# Patient Record
Sex: MALE | Race: WHITE | NOT HISPANIC OR LATINO | Employment: FULL TIME | URBAN - METROPOLITAN AREA
[De-identification: names, ages, dates, MRNs, and addresses within clinical notes are randomized per-mention and may not be internally consistent; named-entity substitution may affect disease eponyms.]

---

## 2023-02-06 ENCOUNTER — OFFICE VISIT (OUTPATIENT)
Dept: INTERNAL MEDICINE | Age: 28
End: 2023-02-06
Payer: COMMERCIAL

## 2023-02-06 VITALS
HEIGHT: 71 IN | DIASTOLIC BLOOD PRESSURE: 72 MMHG | BODY MASS INDEX: 34.44 KG/M2 | SYSTOLIC BLOOD PRESSURE: 110 MMHG | HEART RATE: 89 BPM | TEMPERATURE: 97.5 F | OXYGEN SATURATION: 97 % | WEIGHT: 246 LBS

## 2023-02-06 DIAGNOSIS — E66.09 CLASS 1 OBESITY DUE TO EXCESS CALORIES WITH BODY MASS INDEX (BMI) OF 34.0 TO 34.9 IN ADULT, UNSPECIFIED WHETHER SERIOUS COMORBIDITY PRESENT: ICD-10-CM

## 2023-02-06 DIAGNOSIS — Z76.89 ENCOUNTER TO ESTABLISH CARE: Primary | ICD-10-CM

## 2023-02-06 DIAGNOSIS — E55.9 VITAMIN D DEFICIENCY: ICD-10-CM

## 2023-02-06 PROBLEM — E66.811 CLASS 1 OBESITY DUE TO EXCESS CALORIES WITH BODY MASS INDEX (BMI) OF 34.0 TO 34.9 IN ADULT: Status: ACTIVE | Noted: 2023-02-06

## 2023-02-06 PROCEDURE — 99204 OFFICE O/P NEW MOD 45 MIN: CPT

## 2023-02-06 NOTE — PROGRESS NOTES
"    I N T E R N A L  M E D I C I N E  Mary Moreno, APRN    ENCOUNTER DATE:  02/06/2023    Sukhwinder KEYS Hiser / 27 y.o. / male      CHIEF COMPLAINT / REASON FOR OFFICE VISIT     Establish Care, Weight Gain, and ADHD (Need to be tested . Having issue on focus on the new job/)      ASSESSMENT & PLAN     Diagnoses and all orders for this visit:    1. Encounter to establish care (Primary)  -     CBC & Differential  -     Comprehensive Metabolic Panel  -     Hemoglobin A1c  -     Lipid Panel With / Chol / HDL Ratio  -     TSH+Free T4  -     Urinalysis With Microscopic If Indicated (No Culture) - Urine, Clean Catch    2. Class 1 obesity due to excess calories with body mass index (BMI) of 34.0 to 34.9 in adult, unspecified whether serious comorbidity present    3. Vitamin D deficiency  -     Vitamin D,25-Hydroxy         SUMMARY/DISCUSSION  • Agreeable for labs at today's appointment.  • Discussed possibility of psychiatry referral for ADHD evaluation.  Would like to HOLD at this time.  • Discussed possibility of GLP-1 injectable treatment for weight loss, including risks, benefits, side effects.  He would like to possibly proceed with RX pending lab results.      Next Appointment with me: Visit date not found    Return in about 4 months (around 6/6/2023) for Annual physical.      VITAL SIGNS     Visit Vitals  /72   Pulse 89   Temp 97.5 °F (36.4 °C)   Ht 180.3 cm (71\")   Wt 112 kg (246 lb)   SpO2 97%   BMI 34.31 kg/m²             Wt Readings from Last 3 Encounters:   02/06/23 112 kg (246 lb)     Body mass index is 34.31 kg/m².        MEDICATIONS AT THE TIME OF OFFICE VISIT     No current outpatient medications on file prior to visit.     No current facility-administered medications on file prior to visit.        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  Works as a ; former " [employer].    Will be getting  this May 2023.    He has been focused on limiting cheese and salt in diet.  Is incorporating protein, vegetables in diet.  He is going to gym 3x weekly.    Denies any personal or family history of pancreatitis or thyroid cancer.  Mom with hx of hyperthyroidism.      He suspects history of ADHD, but has never received a formal diagnosis.  Reports ongoing difficulty focusing at work, more so after transitioning to desk job.  Did have problems as a child in school with inattention, acting out.  No concerns for depression/ anxiety.      Patient Care Team:  Mary Moreno APRN as PCP - General (Family Medicine)    REVIEW OF SYSTEMS     Review of Systems   Constitutional: Negative for chills, fever and unexpected weight change.   Respiratory: Negative for cough, chest tightness and shortness of breath.    Cardiovascular: Negative for chest pain, palpitations and leg swelling.   Gastrointestinal: Negative for abdominal pain, constipation, nausea and vomiting.   Neurological: Negative for dizziness, weakness, light-headedness and headaches.   Psychiatric/Behavioral: Negative for dysphoric mood, self-injury and suicidal ideas. The patient is not nervous/anxious.           PHYSICAL EXAMINATION     Physical Exam  Vitals reviewed.   Constitutional:       General: He is not in acute distress.     Appearance: Normal appearance. He is not ill-appearing, toxic-appearing or diaphoretic.   HENT:      Head: Normocephalic and atraumatic.   Cardiovascular:      Rate and Rhythm: Normal rate and regular rhythm.      Heart sounds: Normal heart sounds.   Pulmonary:      Effort: Pulmonary effort is normal.      Breath sounds: Normal breath sounds.   Abdominal:      Palpations: Abdomen is soft.      Tenderness: There is no abdominal tenderness.   Musculoskeletal:      Right lower leg: No edema.      Left lower leg: No edema.   Neurological:      Mental Status: He is alert and oriented to person, place, and  time. Mental status is at baseline.   Psychiatric:         Mood and Affect: Mood normal.         Behavior: Behavior normal.         Thought Content: Thought content normal.         Judgment: Judgment normal.           REVIEWED DATA     Labs:           Imaging:            Medical Tests:           Summary of old records / correspondence / consultant report:           Request outside records:

## 2023-02-07 ENCOUNTER — TELEPHONE (OUTPATIENT)
Dept: INTERNAL MEDICINE | Age: 28
End: 2023-02-07

## 2023-02-07 DIAGNOSIS — R73.03 PREDIABETES: ICD-10-CM

## 2023-02-07 DIAGNOSIS — E78.00 PURE HYPERCHOLESTEROLEMIA: ICD-10-CM

## 2023-02-07 DIAGNOSIS — E66.09 CLASS 1 OBESITY DUE TO EXCESS CALORIES WITH SERIOUS COMORBIDITY AND BODY MASS INDEX (BMI) OF 34.0 TO 34.9 IN ADULT: Primary | ICD-10-CM

## 2023-02-07 LAB
25(OH)D3+25(OH)D2 SERPL-MCNC: 21.3 NG/ML (ref 30–100)
ALBUMIN SERPL-MCNC: 4.4 G/DL (ref 3.5–5.2)
ALBUMIN/GLOB SERPL: 2 G/DL
ALP SERPL-CCNC: 46 U/L (ref 39–117)
ALT SERPL-CCNC: 43 U/L (ref 1–41)
APPEARANCE UR: CLEAR
AST SERPL-CCNC: 28 U/L (ref 1–40)
BACTERIA #/AREA URNS HPF: NORMAL /HPF
BASOPHILS # BLD AUTO: 0.04 10*3/MM3 (ref 0–0.2)
BASOPHILS NFR BLD AUTO: 0.7 % (ref 0–1.5)
BILIRUB SERPL-MCNC: 0.4 MG/DL (ref 0–1.2)
BILIRUB UR QL STRIP: NEGATIVE
BUN SERPL-MCNC: 12 MG/DL (ref 6–20)
BUN/CREAT SERPL: 14.1 (ref 7–25)
CALCIUM SERPL-MCNC: 9.2 MG/DL (ref 8.6–10.5)
CASTS URNS MICRO: NORMAL
CHLORIDE SERPL-SCNC: 105 MMOL/L (ref 98–107)
CHOLEST SERPL-MCNC: 199 MG/DL (ref 0–200)
CHOLEST/HDLC SERPL: 3.9 {RATIO}
CO2 SERPL-SCNC: 26.5 MMOL/L (ref 22–29)
COLOR UR: YELLOW
CREAT SERPL-MCNC: 0.85 MG/DL (ref 0.76–1.27)
EGFRCR SERPLBLD CKD-EPI 2021: 120.6 ML/MIN/1.73
EOSINOPHIL # BLD AUTO: 0.21 10*3/MM3 (ref 0–0.4)
EOSINOPHIL NFR BLD AUTO: 3.6 % (ref 0.3–6.2)
EPI CELLS #/AREA URNS HPF: NORMAL /HPF
ERYTHROCYTE [DISTWIDTH] IN BLOOD BY AUTOMATED COUNT: 12.3 % (ref 12.3–15.4)
GLOBULIN SER CALC-MCNC: 2.2 GM/DL
GLUCOSE SERPL-MCNC: 104 MG/DL (ref 65–99)
GLUCOSE UR QL STRIP: NEGATIVE
HBA1C MFR BLD: 5.8 % (ref 4.8–5.6)
HCT VFR BLD AUTO: 42.2 % (ref 37.5–51)
HDLC SERPL-MCNC: 51 MG/DL (ref 40–60)
HGB BLD-MCNC: 14.6 G/DL (ref 13–17.7)
HGB UR QL STRIP: NEGATIVE
IMM GRANULOCYTES # BLD AUTO: 0.01 10*3/MM3 (ref 0–0.05)
IMM GRANULOCYTES NFR BLD AUTO: 0.2 % (ref 0–0.5)
KETONES UR QL STRIP: NEGATIVE
LDLC SERPL CALC-MCNC: 123 MG/DL (ref 0–100)
LEUKOCYTE ESTERASE UR QL STRIP: ABNORMAL
LYMPHOCYTES # BLD AUTO: 1.86 10*3/MM3 (ref 0.7–3.1)
LYMPHOCYTES NFR BLD AUTO: 31.8 % (ref 19.6–45.3)
MCH RBC QN AUTO: 30.8 PG (ref 26.6–33)
MCHC RBC AUTO-ENTMCNC: 34.6 G/DL (ref 31.5–35.7)
MCV RBC AUTO: 89 FL (ref 79–97)
MONOCYTES # BLD AUTO: 0.63 10*3/MM3 (ref 0.1–0.9)
MONOCYTES NFR BLD AUTO: 10.8 % (ref 5–12)
NEUTROPHILS # BLD AUTO: 3.09 10*3/MM3 (ref 1.7–7)
NEUTROPHILS NFR BLD AUTO: 52.9 % (ref 42.7–76)
NITRITE UR QL STRIP: NEGATIVE
NRBC BLD AUTO-RTO: 0 /100 WBC (ref 0–0.2)
PH UR STRIP: 6 [PH] (ref 5–8)
PLATELET # BLD AUTO: 413 10*3/MM3 (ref 140–450)
POTASSIUM SERPL-SCNC: 4.5 MMOL/L (ref 3.5–5.2)
PROT SERPL-MCNC: 6.6 G/DL (ref 6–8.5)
PROT UR QL STRIP: NEGATIVE
RBC # BLD AUTO: 4.74 10*6/MM3 (ref 4.14–5.8)
RBC #/AREA URNS HPF: NORMAL /HPF
SODIUM SERPL-SCNC: 141 MMOL/L (ref 136–145)
SP GR UR STRIP: 1.02 (ref 1–1.03)
T4 FREE SERPL-MCNC: 1.09 NG/DL (ref 0.93–1.7)
TRIGL SERPL-MCNC: 143 MG/DL (ref 0–150)
TSH SERPL DL<=0.005 MIU/L-ACNC: 0.96 UIU/ML (ref 0.27–4.2)
UROBILINOGEN UR STRIP-MCNC: ABNORMAL MG/DL
VLDLC SERPL CALC-MCNC: 25 MG/DL (ref 5–40)
WBC # BLD AUTO: 5.84 10*3/MM3 (ref 3.4–10.8)
WBC #/AREA URNS HPF: NORMAL /HPF

## 2023-02-07 RX ORDER — SEMAGLUTIDE 0.25 MG/.5ML
0.25 INJECTION, SOLUTION SUBCUTANEOUS WEEKLY
Qty: 2 ML | Refills: 0 | Status: SHIPPED | OUTPATIENT
Start: 2023-02-07 | End: 2023-03-01

## 2023-02-07 NOTE — TELEPHONE ENCOUNTER
Caller: Sukhwinder Gonzalez    Relationship: Self    Best call back number: [phone]    Who are you requesting to speak with (clinical staff, provider,  specific staff member): CLINICAL STAFF     What was the call regarding: NEEDING A PA FOR Semaglutide-Weight Management (Wegovy) 0.25 MG/0.5ML solution auto-injector  INDICATING WHY ITS MEDICAL NECESSARY   FOR PATIENT     Do you require a callback:YES

## 2023-02-15 DIAGNOSIS — R41.840 INATTENTION: Primary | ICD-10-CM

## 2023-06-15 ENCOUNTER — OFFICE VISIT (OUTPATIENT)
Dept: INTERNAL MEDICINE | Age: 28
End: 2023-06-15
Payer: COMMERCIAL

## 2023-06-15 VITALS
SYSTOLIC BLOOD PRESSURE: 120 MMHG | WEIGHT: 235 LBS | HEART RATE: 113 BPM | BODY MASS INDEX: 32.9 KG/M2 | HEIGHT: 71 IN | TEMPERATURE: 97.8 F | OXYGEN SATURATION: 96 % | DIASTOLIC BLOOD PRESSURE: 80 MMHG

## 2023-06-15 DIAGNOSIS — E55.9 VITAMIN D DEFICIENCY: ICD-10-CM

## 2023-06-15 DIAGNOSIS — R73.03 PREDIABETES: ICD-10-CM

## 2023-06-15 DIAGNOSIS — G47.9 SLEEP DISTURBANCE: ICD-10-CM

## 2023-06-15 DIAGNOSIS — F17.290 VAPING NICOTINE DEPENDENCE, TOBACCO PRODUCT: ICD-10-CM

## 2023-06-15 DIAGNOSIS — Z23 ENCOUNTER FOR IMMUNIZATION: ICD-10-CM

## 2023-06-15 DIAGNOSIS — Z00.00 ANNUAL PHYSICAL EXAM: Primary | ICD-10-CM

## 2023-06-15 PROBLEM — K29.70 GASTRITIS: Status: ACTIVE | Noted: 2018-07-13

## 2023-06-15 PROBLEM — G47.00 INSOMNIA: Status: ACTIVE | Noted: 2018-07-13

## 2023-06-15 PROBLEM — Z86.69 HISTORY OF OTITIS MEDIA: Status: ACTIVE | Noted: 2023-06-15

## 2023-06-15 PROBLEM — F41.9 ANXIETY: Status: ACTIVE | Noted: 2019-11-19

## 2023-06-15 PROBLEM — F17.200 SMOKES AND MOTIVATED TO QUIT: Status: ACTIVE | Noted: 2017-07-31

## 2023-06-15 NOTE — PROGRESS NOTES
"    I N T E R N A L  M E D I C I N E  Mary Moreno, SALOME      ENCOUNTER DATE:  06/15/2023    Sukhwinder KEYS Hiser / 27 y.o. / male    CHIEF COMPLAINT     Annual Exam    Accompanied by his wife to today's appointment.    BMI 32: Down 11 pounds since February 2023.  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  Works as a ; former .  He does continue to vape regularly.      Recently  as of May 2023.  Prior to wedding/ honeymoon, he was focused on healthy dietary choices and regular exercise.  He has recently been less focused.  He is still interested in pursuing possible GLP-1 use but is waiting to be added to his wife's medical insurance.    Does report chronic, poor sleep.  He falls asleep at midnight and sleeps until approximately 5 AM.  Wife report he does snore and has very restless sleep, tossing and turning.      Now followed by [facility] for ADHD.  Prescribed Adderall 15 mg in the AM and 15 mg in the PM with benefit.    Prediabetes: February 2023 A1C of 5.8.    HLD: February 2023 Lipid panel LDL of 123; triglycerides 143.    Vitamin D deficiency: February 2023 Vitamin D 21.3.  He did take vitamin D supplementation for a short time.    VITALS     Visit Vitals  /80   Pulse 113   Temp 97.8 °F (36.6 °C)   Ht 180.3 cm (70.98\")   Wt 107 kg (235 lb)   SpO2 96%   BMI 32.79 kg/m²       BP Readings from Last 3 Encounters:   06/15/23 120/80   02/06/23 110/72     Wt Readings from Last 3 Encounters:   06/15/23 107 kg (235 lb)   02/06/23 112 kg (246 lb)      Body mass index is 32.79 kg/m².      MEDICATIONS     Current Outpatient Medications on File Prior to Visit   Medication Sig Dispense Refill    amphetamine-dextroamphetamine (ADDERALL) 15 MG tablet Take 1 tablet by mouth Daily at 1 pm. 30 tablet 0    amphetamine-dextroamphetamine (ADDERALL) 15 MG tablet Take 1 tablet by mouth " Every Morning. 30 tablet 0     No current facility-administered medications on file prior to visit.         HISTORY OF PRESENT ILLNESS      Sukhwinder presents for annual health maintenance visit.    General health: fair  Lifestyle:  Attempting to lose weight?: Yes   Diet: has not been eating as healthy  Exercise: does not exercise  Tobacco: Vapes regularly    Alcohol: 2 days/week  Work: Full-time  Reproductive health:  Sexually active?: Yes   Concern for STD?: No   Sexual problems?: No problems   Sees Urologist?: No   Depression Screenin/6/2023     7:46 AM   PHQ-2/PHQ-9 Depression Screening   Little Interest or Pleasure in Doing Things 0-->not at all   Feeling Down, Depressed or Hopeless 0-->not at all   PHQ-9: Brief Depression Severity Measure Score 0         PHQ-2: 0 (Not depressed)     PHQ-9: 0 (Negative screening for depression)    Patient Care Team:  Mary Moreno APRN as PCP - General (Family Medicine)  ______________________________________________________________________    ALLERGIES  No Known Allergies     PFSH:     The following portions of the patient's history were reviewed and updated as appropriate: Allergies / Current Medications / Past Medical History / Surgical History / Social History / Family History    PROBLEM LIST   Patient Active Problem List   Diagnosis    Class 1 obesity due to excess calories with body mass index (BMI) of 34.0 to 34.9 in adult    Prediabetes    Pure hypercholesterolemia    Anxiety    Gastritis    History of otitis media    Insomnia    Smokes and motivated to quit       PAST MEDICAL HISTORY  Past Medical History:   Diagnosis Date    ADHD (attention deficit hyperactivity disorder)     Never tested or treated    HL (hearing loss)     Multiple surgeries for ‘tubes’ in ears at a young age    Visual impairment     Carolyn had contacts/prescription glasses since I was 10-12 years old (roughly)       SURGICAL HISTORY  Past Surgical History:   Procedure Laterality Date     FRACTURE SURGERY  2017    Suffered a fractured/broken jaw and have a plate/screws now. Surgery done at [facility]       SOCIAL HISTORY  Social History     Socioeconomic History    Marital status: Single   Tobacco Use    Smoking status: Former     Packs/day: 1.00     Years: 10.00     Pack years: 10.00     Types: Cigarettes, Electronic Cigarette     Start date: 2013     Quit date: 2022     Years since quittin.4   Vaping Use    Vaping Use: Every day    Substances: Nicotine   Substance and Sexual Activity    Alcohol use: Yes     Comment: A few beers on social occasions    Drug use: Never    Sexual activity: Yes     Partners: Female     Birth control/protection: I.U.D.     Comment: Fiance has an IUD- fiancé has a history of HPV       FAMILY HISTORY  Family History   Problem Relation Age of Onset    Vision loss Mother         Poor vision, glasses and contacts    Thyroid disease Mother         thyroid removed    Heart failure Maternal Grandmother 82    Breast cancer Paternal Grandmother     Lupus Paternal Grandmother        IMMUNIZATION HISTORY  Immunization History   Administered Date(s) Administered    COVID-19 (PFIZER) Purple Cap Monovalent 2021, 2021    DTaP 2012    DTaP, Unspecified 1998    FluLaval/Fluzone >6mos 2019    Influenza, Unspecified 2019    MMR 1998    OPV 1998    Tdap 2012         REVIEW OF SYSTEMS     Review of Systems   Constitutional:  Negative for chills, fever and unexpected weight change.   Respiratory:  Negative for cough, chest tightness and shortness of breath.    Cardiovascular:  Negative for chest pain, palpitations and leg swelling.   Neurological:  Negative for dizziness, weakness, light-headedness and headaches.   Psychiatric/Behavioral:  Positive for sleep disturbance. The patient is not nervous/anxious.      PHYSICAL EXAMINATION     Physical Exam  Vitals reviewed.   Constitutional:       General: He is not in acute distress.      Appearance: Normal appearance. He is not ill-appearing, toxic-appearing or diaphoretic.   HENT:      Head: Normocephalic and atraumatic.   Cardiovascular:      Rate and Rhythm: Normal rate and regular rhythm.      Heart sounds: Normal heart sounds.   Pulmonary:      Effort: Pulmonary effort is normal.      Breath sounds: Normal breath sounds.   Neurological:      Mental Status: He is alert and oriented to person, place, and time. Mental status is at baseline.   Psychiatric:         Mood and Affect: Mood normal.         Behavior: Behavior normal.         Thought Content: Thought content normal.         Judgment: Judgment normal.       REVIEWED DATA      Labs:    Lab Results   Component Value Date     02/06/2023    K 4.5 02/06/2023    CALCIUM 9.2 02/06/2023    AST 28 02/06/2023    ALT 43 (H) 02/06/2023    BUN 12 02/06/2023    CREATININE 0.85 02/06/2023    CREATININE 0.7 01/14/2021    CREATININE 0.7 11/19/2019       Lab Results   Component Value Date    GLUCOSE 104 (H) 02/06/2023    HGBA1C 5.80 (H) 02/06/2023    HGBA1C 5.5 01/14/2021    TSH 0.964 02/06/2023    FREET4 1.09 02/06/2023       No results found for: PSA    [unfilled]    Lab Results   Component Value Date     (H) 02/06/2023    HDL 51 02/06/2023    TRIG 143 02/06/2023    CHOLHDLRATIO 3.90 02/06/2023       No components found for: [id number]    Lab Results   Component Value Date    WBC 5.84 02/06/2023    HGB 14.6 02/06/2023    MCV 89.0 02/06/2023     02/06/2023       Lab Results   Component Value Date    PROTEIN Negative 02/06/2023    GLUCOSEU Negative 02/06/2023    BLOODU Negative 02/06/2023    NITRITEU Negative 02/06/2023    LEUKOCYTESUR Trace (A) 02/06/2023          Lab Results   Component Value Date    HEPCVIRUSABY Nonreactive 11/19/2019       Imaging:           Medical Tests:           ASSESSMENT & PLAN     ANNUAL WELLNESS EXAM / PHYSICAL     Diagnoses and all orders for this visit:    1. Annual physical exam (Primary)    2. Prediabetes  -      Hemoglobin A1c  -     Comprehensive Metabolic Panel    3. Sleep disturbance  -     Ambulatory Referral to Sleep Medicine    4. Vaping nicotine dependence, tobacco product    5. Vitamin D deficiency  -     Vitamin D,25-Hydroxy    6. Encounter for immunization  -     Tdap Vaccine Greater Than or Equal To 8yo IM         Summary/Discussion:     Will evaluate for sleep apnea with sleep medicine referral.  Brainstormed with pt and his wife possible ways to help with decreasing vaping use and ultimately, cessation.  He has plans to use gum to help with reduced use.  He will let me know when he obtains new insurance coverage, and will anticipate GLP-1 prescription.     Next Appointment with me: Visit date not found    Return for 6 month chronic care, then 1 year annual physical.      HEALTHCARE MAINTENANCE ISSUES       Cancer Screening:  Colon: Initial/Next screening at age: 45  Repeat colon cancer screening: N/A at this time  Prostate: Start screening at 45 then annually  Testicular: Recommended monthly self exam  Skin: Monthly self skin examination, annual exam by health professional  Lung: Does not meet criteria for lung cancer screening.   Other:    Screening Labs & Tests:  Lab results reviewed & discussed with with patient or orders placed today.  EKG:  CV Screening: Lipid panel  DEXA (75+ or risk factors):   HEP C (If born [id number] or risk factors): Previously had negative screen  Other:     Immunization/Vaccinations (to be given today unless deferred by patient)  Influenza: Recommended annual influenza vaccine  Hepatitis A: Verify immunization records  Hepatitis B: Verify immunization records  Tetanus/Pertussis: Recommended here or at pharmacy  Pneumovax/PCV: Recommended here or at pharmacy  Shingles: Not needed at this time  COVID: Recommended the bivalent booster shot  Lifestyle Counseling:  Lifestyle Modifications: Attempt to lose weight, Improve dietary compliance, Begin progressive aerobic exercise program  3-5 days a week, Maintain a low sugar/carbohydrate diet, Follow a low fat, low cholesterol diet, Make dinner the lightest meal of day, and Quit vaping   Safety Issues: Always wear seatbelt, Avoid texting while driving   Use sunscreen, regular skin examination  Recommended annual dental/vision examination.  Emotional/Stress/Sleep: Reviewed and  given when appropriate      Health Maintenance   Topic Date Due    COVID-19 Vaccine (3 - Pfizer series) 11/09/2021    TDAP/TD VACCINES (2 - Td or Tdap) 08/04/2022    INFLUENZA VACCINE  10/01/2023    LIPID PANEL  02/06/2024    ANNUAL PHYSICAL  06/15/2024    HEPATITIS C SCREENING  Completed    Pneumococcal Vaccine 0-64  Aged Out

## 2023-06-16 LAB
25(OH)D3+25(OH)D2 SERPL-MCNC: 33.3 NG/ML (ref 30–100)
ALBUMIN SERPL-MCNC: 4.5 G/DL (ref 3.5–5.2)
ALBUMIN/GLOB SERPL: 2.3 G/DL
ALP SERPL-CCNC: 48 U/L (ref 39–117)
ALT SERPL-CCNC: 32 U/L (ref 1–41)
AST SERPL-CCNC: 17 U/L (ref 1–40)
BILIRUB SERPL-MCNC: 0.4 MG/DL (ref 0–1.2)
BUN SERPL-MCNC: 10 MG/DL (ref 6–20)
BUN/CREAT SERPL: 11.6 (ref 7–25)
CALCIUM SERPL-MCNC: 9.5 MG/DL (ref 8.6–10.5)
CHLORIDE SERPL-SCNC: 105 MMOL/L (ref 98–107)
CO2 SERPL-SCNC: 22.9 MMOL/L (ref 22–29)
CREAT SERPL-MCNC: 0.86 MG/DL (ref 0.76–1.27)
EGFRCR SERPLBLD CKD-EPI 2021: 120.2 ML/MIN/1.73
GLOBULIN SER CALC-MCNC: 2 GM/DL
GLUCOSE SERPL-MCNC: 112 MG/DL (ref 65–99)
HBA1C MFR BLD: 5.6 % (ref 4.8–5.6)
POTASSIUM SERPL-SCNC: 4.2 MMOL/L (ref 3.5–5.2)
PROT SERPL-MCNC: 6.5 G/DL (ref 6–8.5)
SODIUM SERPL-SCNC: 142 MMOL/L (ref 136–145)

## 2023-07-05 ENCOUNTER — TELEPHONE (OUTPATIENT)
Dept: INTERNAL MEDICINE | Age: 28
End: 2023-07-05

## 2023-07-05 NOTE — TELEPHONE ENCOUNTER
Caller: Sukhwinder Gonzalez    Relationship: Self    Best call back number: [phone]     Do you know the name of the person who called: MCKAY    What was the call regarding: RX CARD    PLEASE RETURN PATIENTS CALL TO DISCUSS RX CARD

## 2024-05-20 ENCOUNTER — OFFICE VISIT (OUTPATIENT)
Dept: INTERNAL MEDICINE | Age: 29
End: 2024-05-20
Payer: COMMERCIAL

## 2024-05-20 ENCOUNTER — HOSPITAL ENCOUNTER (OUTPATIENT)
Facility: HOSPITAL | Age: 29
Discharge: HOME | End: 2024-05-20
Payer: COMMERCIAL

## 2024-05-20 VITALS
RESPIRATION RATE: 18 BRPM | HEIGHT: 71 IN | BODY MASS INDEX: 34.86 KG/M2 | HEART RATE: 95 BPM | SYSTOLIC BLOOD PRESSURE: 135 MMHG | WEIGHT: 249 LBS | OXYGEN SATURATION: 98 % | DIASTOLIC BLOOD PRESSURE: 80 MMHG | TEMPERATURE: 98 F

## 2024-05-20 DIAGNOSIS — M79.641 RIGHT HAND PAIN: Primary | ICD-10-CM

## 2024-05-20 DIAGNOSIS — Z87.81 HISTORY OF HAND FRACTURE: ICD-10-CM

## 2024-05-20 DIAGNOSIS — F17.200 SMOKES AND MOTIVATED TO QUIT: ICD-10-CM

## 2024-05-20 PROCEDURE — 73130 X-RAY EXAM OF HAND: CPT

## 2024-05-20 PROCEDURE — 99214 OFFICE O/P EST MOD 30 MIN: CPT

## 2024-05-20 NOTE — PROGRESS NOTES
"    I N T E R N A L  M E D I C I N E  Mary Moreno, APRN    ENCOUNTER DATE:  05/20/2024    Sukhwinder KEYS Hiser / 28 y.o. / male      CHIEF COMPLAINT / REASON FOR OFFICE VISIT     right hand pain      ASSESSMENT & PLAN     Diagnoses and all orders for this visit:    1. Right hand pain (Primary)  -     XR Hand 3+ View Right  -     Ambulatory Referral to Hand Surgery    2. History of hand fracture  -     Ambulatory Referral to Hand Surgery    3. Smokes and motivated to quit         SUMMARY/DISCUSSION  Given his prior history of right hand fracture, along with new trauma, recommend further evaluation with updated hand XR and referral to hand specialist.  He was recommend to start wearing ulnar gutter splint.  His wife is OT and states he will obtain splint through her work.  May use ice PRN, naproxen 500 mg BID for 5 days, taken with food, as anti inflammatory for pain and swelling.  Recommend to visit ER for acutely worsening symptoms, diminished sensation, reduced pulse.  He acknowledged understanding.    We discussed medication options such as bupropion and Chantix to quit smoking vs gum/ patches.  He has plans to further discuss these medication options with his psychiatrist.  Also provided patient with [email or web address] resource.  He will let me know outcome of conversation with his psychiatrist in near future.        Next Appointment with me: Visit date not found    Return in about 27 days (around 6/16/2024) for Annual physical.      VITAL SIGNS     Visit Vitals  /80 (BP Location: Left arm, Patient Position: Sitting, Cuff Size: Adult)   Pulse 95   Temp 98 °F (36.7 °C)   Resp 18   Ht 180.3 cm (70.98\")   Wt 113 kg (249 lb)   SpO2 98%   BMI 34.74 kg/m²             Wt Readings from Last 3 Encounters:   05/20/24 113 kg (249 lb)   06/15/23 107 kg (235 lb)   02/06/23 112 kg (246 lb)     Body mass index is 34.74 kg/m².        MEDICATIONS AT THE TIME OF OFFICE VISIT     Current Outpatient Medications on File Prior to " Visit   Medication Sig Dispense Refill    amphetamine-dextroamphetamine (Adderall) 20 MG tablet Take 1 oral tablet 3 times a day 90 tablet 0    [DISCONTINUED] amphetamine-dextroamphetamine (ADDERALL) 15 MG tablet Take 1 tablet by mouth Daily at 1 pm. 30 tablet 0    [DISCONTINUED] amphetamine-dextroamphetamine (ADDERALL) 15 MG tablet Take 1 tablet by mouth Daily at 1 PM. 30 tablet 0    [DISCONTINUED] amphetamine-dextroamphetamine (ADDERALL) 20 MG tablet Take 1 tablet by mouth Daily at 1pm. 30 tablet 0    [DISCONTINUED] amphetamine-dextroamphetamine (ADDERALL) 20 MG tablet Take 1 tablet by mouth Daily at 1pm. 9 tablet 0    [DISCONTINUED] amphetamine-dextroamphetamine (ADDERALL) 20 MG tablet Take 1 tablet by mouth 2 (Two) Times a Day at 8 am and 1 pm. 18 tablet 0    [DISCONTINUED] amphetamine-dextroamphetamine (ADDERALL) 20 MG tablet Take 1 tablet by mouth 2 (Two) Times a Day at 8am and 1pm. 18 tablet 0    [DISCONTINUED] amphetamine-dextroamphetamine (ADDERALL) 20 MG tablet Take 1 tablet by mouth 2 (Two) Times a Day, at 8am and 1pm. 60 tablet 0    [DISCONTINUED] amphetamine-dextroamphetamine (Adderall) 20 MG tablet Take 1.5 tablets by mouth 2 (Two) Times a Day. 90 tablet 0    [DISCONTINUED] amphetamine-dextroamphetamine (Adderall) 20 MG tablet Take 1 tablet by mouth 3 times a day 90 tablet 0    [DISCONTINUED] cloNIDine HCl ER 0.1 MG tablet sustained-release 12 hour tablet Take 1 tablet by mouth at bedtime 30 tablet 0    [DISCONTINUED] traZODone (DESYREL) 50 MG tablet Take 0.5-1 oral tablet at bedtime as needed for sleep/insomnia. 30 tablet 1    [DISCONTINUED] traZODone (DESYREL) 50 MG tablet Take 0.5-1 tablet by mouth at bedtime as needed for sleep/insomnia. 30 tablet 1     No current facility-administered medications on file prior to visit.        HISTORY OF PRESENT ILLNESS     Here today with right fourth and fifth digit swelling and pain since yesterday.  Right handed.  Reports he got angry yesterday and struck his  hand against metal bench in his garage.   He is concerned for possible recurrent Boxer's fracture.  Sustained prior injury while in eighth grade.  He reports at least 3 prior episodes with similar symptoms but no formal evaluation by hand surgery.  Denies any cool extremity, numbness, tingling.    Recently resumed smoking.  Continues to vape.  He is interested in vaping.  Tried bupropion in the past which reportedly increased anxiety.  Mom used Chantix with benefit.        Patient Care Team:  Mary Moreno APRN as PCP - General (Family Medicine)    REVIEW OF SYSTEMS     Review of Systems   Constitutional:  Negative for chills, fever and unexpected weight change.   Respiratory:  Negative for cough, chest tightness and shortness of breath.    Cardiovascular:  Negative for chest pain, palpitations and leg swelling.   Musculoskeletal:  Positive for arthralgias (Right hand).   Neurological:  Negative for dizziness, weakness, light-headedness and headaches.   Psychiatric/Behavioral:  The patient is not nervous/anxious.           PHYSICAL EXAMINATION     Physical Exam  Vitals reviewed.   Constitutional:       General: He is not in acute distress.     Appearance: Normal appearance. He is not ill-appearing, toxic-appearing or diaphoretic.   HENT:      Head: Normocephalic and atraumatic.   Cardiovascular:      Rate and Rhythm: Normal rate and regular rhythm.      Pulses: Normal pulses.      Heart sounds: Normal heart sounds.   Pulmonary:      Effort: Pulmonary effort is normal.      Breath sounds: Normal breath sounds.   Musculoskeletal:      Right hand: Swelling (Generalized, but most pronounced to right fourth and fifth digits) present. Decreased range of motion. Normal sensation. Normal capillary refill. Normal pulse.   Neurological:      Mental Status: He is alert and oriented to person, place, and time. Mental status is at baseline.      Sensory: No sensory deficit.   Psychiatric:         Mood and Affect: Mood normal.          Behavior: Behavior normal.         Thought Content: Thought content normal.         Judgment: Judgment normal.           REVIEWED DATA     Labs:           Imaging:            Medical Tests:           Summary of old records / correspondence / consultant report:           Request outside records:

## 2024-05-21 DIAGNOSIS — F17.200 SMOKES AND MOTIVATED TO QUIT: Primary | ICD-10-CM

## 2024-05-21 RX ORDER — VARENICLINE TARTRATE 0.5 (11)-1
KIT ORAL
Qty: 53 EACH | Refills: 0 | Status: SHIPPED | OUTPATIENT
Start: 2024-05-21 | End: 2024-06-19

## 2024-05-23 DIAGNOSIS — S62.339D CLOSED BOXER'S FRACTURE WITH ROUTINE HEALING, SUBSEQUENT ENCOUNTER: Primary | ICD-10-CM

## 2025-05-12 ENCOUNTER — TELEPHONE (OUTPATIENT)
Dept: INTERNAL MEDICINE | Age: 30
End: 2025-05-12
Payer: COMMERCIAL

## 2025-05-12 NOTE — TELEPHONE ENCOUNTER
LVM for patient to call back.     Per Mary:   If patient is experiencing acute (rapid and severe) hearing loss, patient needs to be seen quickly. Recommend urgent care.

## 2025-05-22 ENCOUNTER — OFFICE VISIT (OUTPATIENT)
Dept: INTERNAL MEDICINE | Age: 30
End: 2025-05-22
Payer: COMMERCIAL

## 2025-05-22 VITALS
BODY MASS INDEX: 35.14 KG/M2 | DIASTOLIC BLOOD PRESSURE: 76 MMHG | SYSTOLIC BLOOD PRESSURE: 124 MMHG | HEIGHT: 71 IN | RESPIRATION RATE: 18 BRPM | TEMPERATURE: 97.8 F | OXYGEN SATURATION: 95 % | HEART RATE: 100 BPM | WEIGHT: 251 LBS

## 2025-05-22 DIAGNOSIS — H91.93 BILATERAL HEARING LOSS, UNSPECIFIED HEARING LOSS TYPE: ICD-10-CM

## 2025-05-22 DIAGNOSIS — Z00.00 ANNUAL PHYSICAL EXAM: ICD-10-CM

## 2025-05-22 DIAGNOSIS — F17.210 CIGARETTE NICOTINE DEPENDENCE WITHOUT COMPLICATION: Primary | ICD-10-CM

## 2025-05-22 DIAGNOSIS — E66.01 CLASS 2 SEVERE OBESITY DUE TO EXCESS CALORIES WITH SERIOUS COMORBIDITY AND BODY MASS INDEX (BMI) OF 35.0 TO 35.9 IN ADULT: ICD-10-CM

## 2025-05-22 DIAGNOSIS — E66.812 CLASS 2 SEVERE OBESITY DUE TO EXCESS CALORIES WITH SERIOUS COMORBIDITY AND BODY MASS INDEX (BMI) OF 35.0 TO 35.9 IN ADULT: ICD-10-CM

## 2025-05-22 RX ORDER — VARENICLINE TARTRATE 0.5 (11)-1
KIT ORAL
Qty: 53 EACH | Refills: 0 | Status: SHIPPED | OUTPATIENT
Start: 2025-05-22 | End: 2025-06-19

## 2025-05-22 RX ORDER — VARENICLINE TARTRATE 1 MG/1
1 TABLET, FILM COATED ORAL 2 TIMES DAILY
Qty: 56 TABLET | Refills: 1 | Status: SHIPPED | OUTPATIENT
Start: 2025-06-19 | End: 2025-08-14

## 2025-05-22 NOTE — PROGRESS NOTES
I N T E R N A L  M E D I C I N E  Mary Moreno, [place]    ENCOUNTER DATE:  05/22/2025    Sukhwinder KEYS Hiser / 29 y.o. / male      CHIEF COMPLAINT / REASON FOR OFFICE VISIT     Weight Loss and Nicotine Dependence      ASSESSMENT & PLAN     Diagnoses and all orders for this visit:    1. Cigarette nicotine dependence without complication (Primary)  -     Varenicline Tartrate, Starter, 0.5 MG X 11 & 1 MG X 42 tablet therapy pack; Take 0.5 mg by mouth Daily for 3 days, THEN 0.5 mg 2 (Two) Times a Day for 4 days, THEN 1 mg 2 (Two) Times a Day for 21 days. Take 0.5 mg po daily x 3 days, then 0.5 mg po bid x 4 days, then 1 mg po bid  Dispense: 1 each; Refill: 0  -     varenicline (Chantix Continuing Month Vinnie) 1 MG tablet; Take 1 tablet by mouth 2 (Two) Times a Day for 56 days.  Dispense: 56 tablet; Refill: 1    2. Class 2 severe obesity due to excess calories with serious comorbidity and body mass index (BMI) of 35.0 to 35.9 in adult    3. Bilateral hearing loss, unspecified hearing loss type  -     Ambulatory Referral to ENT (Otolaryngology)    4. Annual physical exam  -     CBC & Differential  -     Comprehensive Metabolic Panel  -     Hemoglobin A1c  -     Lipid Panel With / Chol / HDL Ratio  -     TSH+Free T4  -     Urinalysis With Microscopic If Indicated (No Culture) - Urine, Clean Catch         SUMMARY/DISCUSSION  Update fasting labs in anticipation of annual physical in 1 month.  Reviewed risks, benefits, side effects of Chantix.  He will let me know if any side effects.  He is motivated to pursue smoking cessation.  Encouraged to avoid vaping, nicotine pouches.    Discussed risks, benefits, side effects of GLP-1 agents.  Insurance does not cover.  We discussed cash pay options for Zepbound.  He is agreeable to consider pending review of labs.  Recommend low calorie diet, regular exercise.   Follow up with ENT to discuss hearing concerns, possible hearing aids.      Next Appointment with me: Visit date not  "found    Return in about 1 month (around 6/22/2025) for Annual physical.      VITAL SIGNS     Visit Vitals  /76   Pulse 100   Temp 97.8 °F (36.6 °C)   Resp 18   Ht 180.3 cm (70.98\")   Wt 114 kg (251 lb)   SpO2 95%   BMI 35.02 kg/m²             Wt Readings from Last 3 Encounters:   05/22/25 114 kg (251 lb)   05/20/24 113 kg (249 lb)   06/15/23 107 kg (235 lb)     Body mass index is 35.02 kg/m².        MEDICATIONS AT THE TIME OF OFFICE VISIT     Current Outpatient Medications on File Prior to Visit   Medication Sig Dispense Refill    amphetamine-dextroamphetamine (ADDERALL) 20 MG tablet Take 1 tablet by mouth 3 times a day. 90 tablet 0    [DISCONTINUED] amphetamine-dextroamphetamine (ADDERALL) 20 MG tablet Take 1.5 tablets by mouth 2 (Two) Times a Day (Morning and 1pm in the afternoon). 90 tablet 0    [DISCONTINUED] amphetamine-dextroamphetamine (ADDERALL) 20 MG tablet Take 1 tablet by mouth 3 times a day. 90 tablet 0    [DISCONTINUED] amphetamine-dextroamphetamine (ADDERALL) 20 MG tablet Take 1 tablet by mouth 3 times a day. 90 tablet 0    [DISCONTINUED] amphetamine-dextroamphetamine (ADDERALL) 20 MG tablet Take 1 tablet by mouth 3 (Three) Times a Day. 90 tablet 0    [DISCONTINUED] amphetamine-dextroamphetamine (ADDERALL) 20 MG tablet Take 1 tablet by mouth 3 times a day. 90 tablet 0    [DISCONTINUED] amphetamine-dextroamphetamine (ADDERALL) 20 MG tablet Take 1 tablet by mouth 3 (Three) Times a Day. 90 tablet 0    [DISCONTINUED] amphetamine-dextroamphetamine (ADDERALL) 20 MG tablet Take 1 tablet by mouth 3 times a day. 90 tablet 0    [DISCONTINUED] amphetamine-dextroamphetamine (ADDERALL) 20 MG tablet Take 1 tablet by mouth 3 times a day. 90 tablet 0    [DISCONTINUED] amphetamine-dextroamphetamine (ADDERALL) 20 MG tablet Take 1 tablet by mouth 3 times a day. 90 tablet 0     No current facility-administered medications on file prior to visit.        HISTORY OF PRESENT ILLNESS     Patient initially scheduled " today's appointment due to concerns of sinus congestion which has since fully resolved.    Wife is expecting first child and he is motivated to pursue smoking cessation.  Smoking a little less than 1 pack of cigarettes daily.  Took Chantix for a couple weeks without side effects, about 1 year ago.  Interested in trying Chantix again.    BMI 35: Weight gain of 16 pounds since June 2023.  Interested in GLP-1 options to help with weight loss.  He is working towards improved, low calorie diet.  Significant stress with work, limiting physical activity and exercise. No personal/ family history of thyroid cancer/ pancreatitis.       Followed by FRAN Werner, through [facility] for ADHD.  Symptoms stable on Adderall 20 mg TID.      [facility].  Found to have bilateral hearing loss.  Recommended hearing aids.  He is requesting ENT referral to address.  Significant ear infections as a child.      Patient Care Team:  Mary Moreno APRN as PCP - General (Family Medicine)    REVIEW OF SYSTEMS     Review of Systems   Constitutional:  Negative for chills, fever and unexpected weight change.   HENT:  Positive for hearing loss.    Respiratory:  Negative for cough, chest tightness and shortness of breath.    Cardiovascular:  Negative for chest pain, palpitations and leg swelling.   Neurological:  Negative for dizziness, weakness, light-headedness and headaches.   Psychiatric/Behavioral:  The patient is not nervous/anxious.           PHYSICAL EXAMINATION     Physical Exam  Vitals reviewed.   Constitutional:       General: He is not in acute distress.     Appearance: Normal appearance. He is not ill-appearing, toxic-appearing or diaphoretic.   HENT:      Head: Normocephalic and atraumatic.      Right Ear: Ear canal and external ear normal. There is no impacted cerumen.      Left Ear: Ear canal and external ear normal. There is no impacted cerumen.      Ears:      Comments: Scarring on  bilateral TMs  Cardiovascular:      Rate and Rhythm: Normal rate and regular rhythm.      Heart sounds: Normal heart sounds.   Pulmonary:      Effort: Pulmonary effort is normal.      Breath sounds: Normal breath sounds.   Musculoskeletal:      Right lower leg: No edema.      Left lower leg: No edema.   Neurological:      Mental Status: He is alert and oriented to person, place, and time. Mental status is at baseline.   Psychiatric:         Mood and Affect: Mood normal.         Behavior: Behavior normal.         Thought Content: Thought content normal.         Judgment: Judgment normal.           REVIEWED DATA     Labs:           Imaging:            Medical Tests:           Summary of old records / correspondence / consultant report:           Request outside records:

## 2025-06-03 ENCOUNTER — LAB (OUTPATIENT)
Facility: HOSPITAL | Age: 30
End: 2025-06-03
Payer: COMMERCIAL

## 2025-06-03 LAB
ALBUMIN SERPL-MCNC: 4.3 G/DL (ref 3.5–5.2)
ALBUMIN/GLOB SERPL: 1.7 G/DL
ALP SERPL-CCNC: 50 U/L (ref 39–117)
ALT SERPL W P-5'-P-CCNC: 43 U/L (ref 1–41)
ANION GAP SERPL CALCULATED.3IONS-SCNC: 10.9 MMOL/L (ref 5–15)
AST SERPL-CCNC: 30 U/L (ref 1–40)
BASOPHILS # BLD AUTO: 0.04 10*3/MM3 (ref 0–0.2)
BASOPHILS NFR BLD AUTO: 0.5 % (ref 0–1.5)
BILIRUB SERPL-MCNC: 0.5 MG/DL (ref 0–1.2)
BILIRUB UR QL STRIP: NEGATIVE
BUN SERPL-MCNC: 10 MG/DL (ref 6–20)
BUN/CREAT SERPL: 11.9 (ref 7–25)
CALCIUM SPEC-SCNC: 9.2 MG/DL (ref 8.6–10.5)
CHLORIDE SERPL-SCNC: 104 MMOL/L (ref 98–107)
CHOLEST SERPL-MCNC: 217 MG/DL (ref 0–200)
CLARITY UR: CLEAR
CO2 SERPL-SCNC: 23.1 MMOL/L (ref 22–29)
COLOR UR: YELLOW
CREAT SERPL-MCNC: 0.84 MG/DL (ref 0.76–1.27)
DEPRECATED RDW RBC AUTO: 42 FL (ref 37–54)
EGFRCR SERPLBLD CKD-EPI 2021: 119.6 ML/MIN/1.73
EOSINOPHIL # BLD AUTO: 0.19 10*3/MM3 (ref 0–0.4)
EOSINOPHIL NFR BLD AUTO: 2.3 % (ref 0.3–6.2)
ERYTHROCYTE [DISTWIDTH] IN BLOOD BY AUTOMATED COUNT: 12.6 % (ref 12.3–15.4)
GLOBULIN UR ELPH-MCNC: 2.6 GM/DL
GLUCOSE SERPL-MCNC: 106 MG/DL (ref 65–99)
GLUCOSE UR STRIP-MCNC: NEGATIVE MG/DL
HBA1C MFR BLD: 5.9 % (ref 4.8–5.6)
HCT VFR BLD AUTO: 43.3 % (ref 37.5–51)
HDLC SERPL QL: 5.56
HDLC SERPL-MCNC: 39 MG/DL (ref 40–60)
HGB BLD-MCNC: 14.8 G/DL (ref 13–17.7)
HGB UR QL STRIP.AUTO: NEGATIVE
IMM GRANULOCYTES # BLD AUTO: 0.02 10*3/MM3 (ref 0–0.05)
IMM GRANULOCYTES NFR BLD AUTO: 0.2 % (ref 0–0.5)
KETONES UR QL STRIP: ABNORMAL
LDLC SERPL CALC-MCNC: 155 MG/DL (ref 0–100)
LEUKOCYTE ESTERASE UR QL STRIP.AUTO: NEGATIVE
LYMPHOCYTES # BLD AUTO: 2.39 10*3/MM3 (ref 0.7–3.1)
LYMPHOCYTES NFR BLD AUTO: 29.5 % (ref 19.6–45.3)
MCH RBC QN AUTO: 31.4 PG (ref 26.6–33)
MCHC RBC AUTO-ENTMCNC: 34.2 G/DL (ref 31.5–35.7)
MCV RBC AUTO: 91.9 FL (ref 79–97)
MONOCYTES # BLD AUTO: 0.84 10*3/MM3 (ref 0.1–0.9)
MONOCYTES NFR BLD AUTO: 10.4 % (ref 5–12)
NEUTROPHILS NFR BLD AUTO: 4.61 10*3/MM3 (ref 1.7–7)
NEUTROPHILS NFR BLD AUTO: 57.1 % (ref 42.7–76)
NITRITE UR QL STRIP: NEGATIVE
NRBC BLD AUTO-RTO: 0 /100 WBC (ref 0–0.2)
PH UR STRIP.AUTO: 6.5 [PH] (ref 5–8)
PLATELET # BLD AUTO: 442 10*3/MM3 (ref 140–450)
PMV BLD AUTO: 9.3 FL (ref 6–12)
POTASSIUM SERPL-SCNC: 3.9 MMOL/L (ref 3.5–5.2)
PROT SERPL-MCNC: 6.9 G/DL (ref 6–8.5)
PROT UR QL STRIP: NEGATIVE
RBC # BLD AUTO: 4.71 10*6/MM3 (ref 4.14–5.8)
SODIUM SERPL-SCNC: 138 MMOL/L (ref 136–145)
SP GR UR STRIP: 1.02 (ref 1–1.03)
T4 FREE SERPL-MCNC: 1.24 NG/DL (ref 0.92–1.68)
TRIGL SERPL-MCNC: 125 MG/DL (ref 0–150)
TSH SERPL DL<=0.05 MIU/L-ACNC: 1.4 UIU/ML (ref 0.27–4.2)
UROBILINOGEN UR QL STRIP: ABNORMAL
VLDLC SERPL-MCNC: 23 MG/DL (ref 5–40)
WBC NRBC COR # BLD AUTO: 8.09 10*3/MM3 (ref 3.4–10.8)

## 2025-06-03 PROCEDURE — 80050 GENERAL HEALTH PANEL: CPT

## 2025-06-03 PROCEDURE — 83036 HEMOGLOBIN GLYCOSYLATED A1C: CPT

## 2025-06-03 PROCEDURE — 80061 LIPID PANEL: CPT

## 2025-06-03 PROCEDURE — 81003 URINALYSIS AUTO W/O SCOPE: CPT

## 2025-06-03 PROCEDURE — 84439 ASSAY OF FREE THYROXINE: CPT

## 2025-06-24 ENCOUNTER — OFFICE VISIT (OUTPATIENT)
Dept: INTERNAL MEDICINE | Age: 30
End: 2025-06-24
Payer: COMMERCIAL

## 2025-06-24 VITALS
HEIGHT: 71 IN | DIASTOLIC BLOOD PRESSURE: 81 MMHG | BODY MASS INDEX: 35.98 KG/M2 | SYSTOLIC BLOOD PRESSURE: 137 MMHG | TEMPERATURE: 97.8 F | OXYGEN SATURATION: 99 % | HEART RATE: 96 BPM | RESPIRATION RATE: 18 BRPM | WEIGHT: 257 LBS

## 2025-06-24 DIAGNOSIS — E66.01 CLASS 2 SEVERE OBESITY DUE TO EXCESS CALORIES WITH SERIOUS COMORBIDITY AND BODY MASS INDEX (BMI) OF 35.0 TO 35.9 IN ADULT: ICD-10-CM

## 2025-06-24 DIAGNOSIS — Z00.00 ANNUAL PHYSICAL EXAM: Primary | ICD-10-CM

## 2025-06-24 DIAGNOSIS — G47.9 SLEEP DISTURBANCE: ICD-10-CM

## 2025-06-24 DIAGNOSIS — E66.812 CLASS 2 SEVERE OBESITY DUE TO EXCESS CALORIES WITH SERIOUS COMORBIDITY AND BODY MASS INDEX (BMI) OF 35.0 TO 35.9 IN ADULT: ICD-10-CM

## 2025-06-24 DIAGNOSIS — Z23 ENCOUNTER FOR IMMUNIZATION: ICD-10-CM

## 2025-06-24 DIAGNOSIS — F17.210 CIGARETTE SMOKER: ICD-10-CM

## 2025-06-24 PROBLEM — R45.89 DYSPHORIC MOOD: Status: ACTIVE | Noted: 2025-06-24

## 2025-06-24 PROCEDURE — 90471 IMMUNIZATION ADMIN: CPT

## 2025-06-24 PROCEDURE — 99213 OFFICE O/P EST LOW 20 MIN: CPT

## 2025-06-24 PROCEDURE — 90715 TDAP VACCINE 7 YRS/> IM: CPT

## 2025-06-24 PROCEDURE — 99395 PREV VISIT EST AGE 18-39: CPT

## 2025-06-24 RX ORDER — TIRZEPATIDE 2.5 MG/.5ML
2.5 INJECTION, SOLUTION SUBCUTANEOUS WEEKLY
Qty: 2 ML | Refills: 0 | Status: SHIPPED | OUTPATIENT
Start: 2025-06-24 | End: 2025-07-16

## 2025-06-24 NOTE — PROGRESS NOTES
"    I N T E R N A L  M E D I C I N E  Mary Josh, [place]      ENCOUNTER DATE:  06/24/2025    Sukhwinder KEYS Hiser / 29 y.o. / male    CHIEF COMPLAINT     Annual Exam    Last seen May 2025.    Scheduled with ENT in July 2025 for evaluation of hearing concerns.    Attempted Chantix to help with smoking cessation but discontinued due to vivid dreams/ nightmares.  Down to 1/2 pack of cigarettes daily.  Not ready to quit at this time.      BMI 35 with prediabetes, June 2025 A1C of 5.9.  Interested in GLP-1 options to help with weight loss.  No personal/ family history of thyroid cancer/ pancreatitis.       HLD: June 2025 lipid panel with elevated ; normal triglycerides 125.  CMP with mild elevation of liver enzyme ALT 43.     Followed by FRAN Werner, through [facility] for ADHD.  Symptoms stable on Adderall 20 mg TID.       VITALS     Visit Vitals  /81   Pulse 96   Temp 97.8 °F (36.6 °C)   Resp 18   Ht 180.3 cm (70.98\")   Wt 117 kg (257 lb)   SpO2 99%   BMI 35.86 kg/m²       BP Readings from Last 3 Encounters:   06/24/25 137/81   05/22/25 124/76   05/20/24 135/80     Wt Readings from Last 3 Encounters:   06/24/25 117 kg (257 lb)   05/22/25 114 kg (251 lb)   05/20/24 113 kg (249 lb)      Body mass index is 35.86 kg/m².      MEDICATIONS     Current Outpatient Medications on File Prior to Visit   Medication Sig Dispense Refill    [START ON 7/9/2025] amphetamine-dextroamphetamine (ADDERALL) 20 MG tablet Take 1 tablet by mouth 3 times a day. 90 tablet 0    [DISCONTINUED] amphetamine-dextroamphetamine (ADDERALL) 20 MG tablet Take 1 tablet by mouth 3 times a day. (Patient not taking: Reported on 6/24/2025) 90 tablet 0    [DISCONTINUED] amphetamine-dextroamphetamine (ADDERALL) 20 MG tablet Take 1 tablet by mouth 3 times a day. (Patient not taking: Reported on 6/24/2025) 90 tablet 0    [DISCONTINUED] varenicline (Chantix Continuing Month Pak) 1 MG tablet Take 1 tablet by mouth 2 (Two) Times a Day for 56 " days. (Patient not taking: Reported on 2025) 56 tablet 1    [DISCONTINUED] Varenicline Tartrate, Starter, 0.5 MG X 11 & 1 MG X 42 tablet therapy pack Take 0.5 mg by mouth Daily for 3 days, THEN 0.5 mg 2 (Two) Times a Day for 4 days, THEN 1 mg 2 (Two) Times a Day for 21 days. (Patient not taking: Reported on 2025) 53 each 0     No current facility-administered medications on file prior to visit.         HISTORY OF PRESENT ILLNESS      Sukhwinder presents for annual health maintenance visit.    General health: fair  Lifestyle:  Attempting to lose weight?: Yes   Diet: has not been eating as healthy  Exercise: exercises rarely  Tobacco: Regular use (~1/2pack/day)   Alcohol: 3 days/week and 2 drinks/occasion  Work: Full-time  Reproductive health:  Sexually active?: Yes   Concern for STD?: No   Sexual problems?: No problems   Sees Urologist?: No   Depression Screenin/22/2025     2:51 PM   PHQ-2/PHQ-9 Depression Screening   Little interest or pleasure in doing things Several days   Feeling down, depressed, or hopeless Several days   Trouble falling or staying asleep, or sleeping too much Not at all   Feeling tired or having little energy Several days   Poor appetite or overeating Several days   Feeling bad about yourself - or that you are a failure or have let yourself or your family down Not at all   Trouble concentrating on things, such as reading the newspaper or watching television Almost all   Moving or speaking so slowly that other people could have noticed? Or the opposite - being so fidgety or restless that you have been moving around a lot more than usual. Not at all   Thoughts that you would be better off dead or hurting yourself in some way Not at all   Patient Health Questionnaire-9 Score 7   How difficult have these problems made it for you to do your work, take care of things at home, or get along with other people? Not difficult at all         PHQ-2: N/A (Has diagnosis of depression and  is NOT on treatment)     PHQ-9: 5-9 (Mild Depression)    Attributes to stress, no SI/ HI    Patient Care Team:  Mary Moreno APRN as PCP - General (Family Medicine)  ______________________________________________________________________    ALLERGIES  No Known Allergies     PFSH:     The following portions of the patient's history were reviewed and updated as appropriate: Allergies / Current Medications / Past Medical History / Surgical History / Social History / Family History    PROBLEM LIST   Patient Active Problem List   Diagnosis    Class 1 obesity due to excess calories with body mass index (BMI) of 34.0 to 34.9 in adult    Prediabetes    Pure hypercholesterolemia    Anxiety    Gastritis    History of otitis media    Insomnia    Smokes and motivated to quit       PAST MEDICAL HISTORY  Past Medical History:   Diagnosis Date    ADHD (attention deficit hyperactivity disorder)     Never tested or treated    HL (hearing loss)     Multiple surgeries for ‘tubes’ in ears at a young age    Visual impairment     Carolyn had contacts/prescription glasses since I was 10-12 years old (roughly)       SURGICAL HISTORY  Past Surgical History:   Procedure Laterality Date    FRACTURE SURGERY  2017    Suffered a fractured/broken jaw and have a plate/screws now. Surgery done at [facility]       SOCIAL HISTORY  Social History     Socioeconomic History    Marital status:    Tobacco Use    Smoking status: Former     Current packs/day: 1.00     Average packs/day: 1 pack/day for 13.5 years (13.5 ttl pk-yrs)     Types: Cigarettes, Electronic Cigarette     Start date: 1/1/2013   Vaping Use    Vaping status: Every Day    Substances: Nicotine    Devices: Refillable tank    Passive vaping exposure: Yes   Substance and Sexual Activity    Alcohol use: Yes     Comment: A few beers on social occasions    Drug use: Never    Sexual activity: Yes     Partners: Female     Birth control/protection: I.U.D.     Comment: Damian has an IUD- raven has a  history of HPV       FAMILY HISTORY  Family History   Problem Relation Age of Onset    Vision loss Mother         Poor vision, glasses and contacts    Thyroid disease Mother         thyroid removed    Other (TBI) Father     Heart failure Maternal Grandmother 82    Breast cancer Paternal Grandmother     Lupus Paternal Grandmother        IMMUNIZATION HISTORY  Immunization History   Administered Date(s) Administered    COVID-19 (PFIZER) Purple Cap Monovalent 08/24/2021, 09/14/2021    DTaP 08/04/2012    DTaP, Unspecified 04/29/1998    Fluzone (or Fluarix & Flulaval for VFC) >6mos 11/19/2019    Influenza, Unspecified 11/19/2019    MMR 04/29/1998    OPV 04/29/1998    Tdap 08/04/2012, 06/24/2025         REVIEW OF SYSTEMS     Review of Systems   Constitutional:  Negative for chills, fever and unexpected weight change.   HENT:  Positive for hearing loss.    Respiratory:  Negative for cough, chest tightness and shortness of breath.    Cardiovascular:  Negative for chest pain, palpitations and leg swelling.   Neurological:  Negative for dizziness, weakness, light-headedness and headaches.   Psychiatric/Behavioral:  Positive for dysphoric mood and sleep disturbance (Snoring). Negative for self-injury and suicidal ideas. The patient is not nervous/anxious.        PHYSICAL EXAMINATION     Physical Exam  Vitals reviewed.   Constitutional:       General: He is not in acute distress.     Appearance: Normal appearance. He is not ill-appearing, toxic-appearing or diaphoretic.   HENT:      Head: Normocephalic and atraumatic.      Right Ear: Tympanic membrane, ear canal and external ear normal. There is no impacted cerumen.      Left Ear: Tympanic membrane, ear canal and external ear normal. There is no impacted cerumen.      Nose: Nose normal. No congestion or rhinorrhea.      Mouth/Throat:      Mouth: Mucous membranes are moist.      Pharynx: Oropharynx is clear. No oropharyngeal exudate or posterior oropharyngeal erythema.       "Tonsils: 3+ on the right. 3+ on the left.   Eyes:      Extraocular Movements: Extraocular movements intact.      Conjunctiva/sclera: Conjunctivae normal.      Pupils: Pupils are equal, round, and reactive to light.   Cardiovascular:      Rate and Rhythm: Normal rate and regular rhythm.      Heart sounds: Normal heart sounds.   Pulmonary:      Effort: Pulmonary effort is normal. No respiratory distress.      Breath sounds: Normal breath sounds.   Abdominal:      General: Bowel sounds are normal.      Palpations: Abdomen is soft.      Tenderness: There is no abdominal tenderness.   Musculoskeletal:         General: Normal range of motion.      Cervical back: Normal range of motion and neck supple.      Right lower leg: No edema.      Left lower leg: No edema.   Lymphadenopathy:      Cervical: No cervical adenopathy.   Skin:     General: Skin is warm and dry.   Neurological:      General: No focal deficit present.      Mental Status: He is alert and oriented to person, place, and time. Mental status is at baseline.   Psychiatric:         Mood and Affect: Mood normal.         Behavior: Behavior normal.         Thought Content: Thought content normal.         Judgment: Judgment normal.         REVIEWED DATA      Labs:    Lab Results   Component Value Date     06/03/2025    K 3.9 06/03/2025    CALCIUM 9.2 06/03/2025    AST 30 06/03/2025    ALT 43 (H) 06/03/2025    BUN 10.0 06/03/2025    CREATININE 0.84 06/03/2025    CREATININE 0.86 06/15/2023    CREATININE 0.85 02/06/2023       Lab Results   Component Value Date    GLUCOSE 106 (H) 06/03/2025    HGBA1C 5.90 (H) 06/03/2025    HGBA1C 5.60 06/15/2023    HGBA1C 5.80 (H) 02/06/2023    TSH 1.400 06/03/2025    FREET4 1.24 06/03/2025       No results found for: \"PSA\"    [unfilled]    Lab Results   Component Value Date     (H) 06/03/2025    HDL 39 (L) 06/03/2025    TRIG 125 06/03/2025    CHOLHDLRATIO 5.56 06/03/2025       No components found for: \"BWSP149G\"    Lab Results "   Component Value Date    WBC 8.09 06/03/2025    HGB 14.8 06/03/2025    MCV 91.9 06/03/2025     06/03/2025       Lab Results   Component Value Date    PROTEIN Negative 02/06/2023    GLUCOSEU Negative 06/03/2025    BLOODU Negative 06/03/2025    NITRITEU Negative 06/03/2025    LEUKOCYTESUR Negative 06/03/2025          Lab Results   Component Value Date    HEPCVIRUSABY Nonreactive 11/19/2019       Imaging:           Medical Tests:           ASSESSMENT & PLAN     ANNUAL WELLNESS EXAM / PHYSICAL     Diagnoses and all orders for this visit:    1. Annual physical exam (Primary)    2. Class 2 severe obesity due to excess calories with serious comorbidity and body mass index (BMI) of 35.0 to 35.9 in adult  -     Tirzepatide-Weight Management (Zepbound) 2.5 MG/0.5ML solution; Inject 0.5 mL under the skin into the appropriate area as directed 1 (One) Time Per Week for 4 doses.  Dispense: 2 mL; Refill: 0  -     Ambulatory Referral to Sleep Medicine    3. Cigarette smoker    4. Sleep disturbance  -     Ambulatory Referral to Sleep Medicine    5. Encounter for immunization  -     Tdap Vaccine Greater Than or Equal To 8yo IM         Summary/Discussion:     Reviewed risks, benefits, side effects of GLP-1 agents with patient.  He would like to proceed with Zepbound via [employer].  Encouraged low calorie diet, regular exercise. Recheck in 1 month.  Continue to work towards cigarette reduction.  Rule out NEGRA with sleep medicine evaluation.   Follow up with ENT as scheduled.    Next Appointment with me: Visit date not found    Return for 1 month recheck weight loss (may be via telehealth).      HEALTHCARE MAINTENANCE ISSUES       Cancer Screening:  Colon: Initial/Next screening at age: 45  Repeat colon cancer screening: N/A at this time  Prostate: No screening needed at this time  Testicular: Recommended monthly self exam  Skin: Monthly self skin examination, annual exam by health professional  Lung: Does not meet criteria for  lung cancer screening.   Other:    Screening Labs & Tests:  Lab results reviewed & discussed with with patient or orders placed today.  EKG:  CV Screening: Lipid panel  DEXA (75+ or risk factors):   HEP C (If born [id number] or risk factors): Previously had negative screen  Other:     Immunization/Vaccinations (to be given today unless deferred by patient)  Influenza: Recommended annual influenza vaccine  Hepatitis A: Verify immunization records  Hepatitis B: Verify immunization records  Tetanus/Pertussis: Administer today  Pneumovax/PCV: Recommended here or at pharmacy  Shingles: Not needed at this time  COVID: Does not plan to get the latest booster  Lifestyle Counseling:  Lifestyle Modifications: Attempt to lose weight, Improve dietary compliance, Begin progressive aerobic exercise program 3-5 days a week, Maintain a low sugar/carbohydrate diet, Follow a low fat, low cholesterol diet, Maintain low sodium diet (< 3 gm) for blood pressure, Make dinner the lightest meal of day, Quit smoking, and Discussed better management of stress/anxiety  Safety Issues: Always wear seatbelt, Avoid texting while driving   Use sunscreen, regular skin examination  Recommended annual dental/vision examination.  Emotional/Stress/Sleep: Reviewed and  given when appropriate      Health Maintenance   Topic Date Due    COVID-19 Vaccine (3 - 2024-25 season) 12/24/2025 (Originally 9/1/2024)    INFLUENZA VACCINE  07/01/2025    LIPID PANEL  06/03/2026    ANNUAL PHYSICAL  06/24/2026    TDAP/TD VACCINES (3 - Td or Tdap) 06/24/2035    HEPATITIS C SCREENING  Completed    Pneumococcal Vaccine 0-49  Aged Out

## 2025-06-24 NOTE — LETTER
[facility]  Vaccine Consent Form    Patient Name:  Sukhwinder Gonzalez  Patient :  1995     Vaccine(s) Ordered    Tdap Vaccine Greater Than or Equal To 8yo IM        Screening Checklist  The following questions should be completed prior to vaccination. If you answer “yes” to any question, it does not necessarily mean you should not be vaccinated. It just means we may need to clarify or ask more questions. If a question is unclear, please ask your healthcare provider to explain it.    Yes No   Any fever or moderate to severe illness today (mild illness and/or antibiotic treatment are not contraindications)?     Do you have a history of a serious reaction to any previous vaccinations, such as anaphylaxis, encephalopathy within 7 days, Guillain-Plymouth syndrome within 6 weeks, seizure?     Have you received any live vaccine(s) (e.g MMR, KIM) or any other vaccines in the last month (to ensure duplicate doses aren't given)?     Do you have an anaphylactic allergy to latex (DTaP, DTaP-IPV, Hep A, Hep B, MenB, RV, Td, Tdap), baker’s yeast (Hep B, HPV), polysorbates (RSV, nirsevimab, PCV 20 and 21, Rotavirrus, Tdap, Shingrix), or gelatin (KIM, MMR)?     Do you have an anaphylactic allergy to neomycin (Rabies, KIM, MMR, IPV, Hep A), polymyxin B (IPV), or streptomycin (IPV)?      Any cancer, leukemia, AIDS, or other immune system disorder? (KIM, MMR, RV)     Do you have a parent, brother, or sister with an immune system problem (if immune competence of vaccine recipient clinically verified, can proceed)? (MMR, KIM)     Any recent steroid treatments for >2 weeks, chemotherapy, or radiation treatment? (KIM, MMR)     Have you received antibody-containing blood transfusions or IVIG in the past 11 months (recommended interval is dependent on product)? (MMR, KIM)     Have you taken antiviral drugs (acyclovir, famciclovir, valacyclovir for KIM) in the last 24 or 48 hours, respectively?      Are you pregnant or planning to  "become pregnant within 1 month? (KIM, MMR, HPV, IPV, MenB, Abrexvy; For Hep B- refer to Engerix-B; For RSV - Abrysvo is indicated for 32-36 weeks of pregnancy from September to January)     For infants, have you ever been told your child has had intussusception or a medical emergency involving obstruction of the intestine (Rotavirus)? If not for an infant, can skip this question.         *Ordering Physicians/APC should be consulted if \"yes\" is checked by the patient or guardian above.  I have received, read, and understand the Vaccine Information Statement (VIS) for each vaccine ordered.  I have considered my or my child's health status as well as the health status of my close contacts.  I have taken the opportunity to discuss my vaccine questions with my or my child's health care provider.   I have requested that the ordered vaccine(s) be given to me or my child.  I understand the benefits and risks of the vaccines.  I understand that I should remain in the clinic for 15 minutes after receiving the vaccine(s).  _________________________________________________________  Signature of Patient or Parent/Legal Guardian ____________________  Date     "

## 2025-07-17 ENCOUNTER — OFFICE VISIT (OUTPATIENT)
Facility: HOSPITAL | Age: 30
End: 2025-07-17
Payer: COMMERCIAL

## 2025-07-17 VITALS
WEIGHT: 250 LBS | DIASTOLIC BLOOD PRESSURE: 77 MMHG | SYSTOLIC BLOOD PRESSURE: 118 MMHG | HEIGHT: 71 IN | BODY MASS INDEX: 35 KG/M2 | OXYGEN SATURATION: 95 % | HEART RATE: 107 BPM

## 2025-07-17 DIAGNOSIS — F90.2 ATTENTION DEFICIT HYPERACTIVITY DISORDER (ADHD), COMBINED TYPE: ICD-10-CM

## 2025-07-17 DIAGNOSIS — G47.9 SLEEP DISTURBANCE: ICD-10-CM

## 2025-07-17 DIAGNOSIS — E66.812 CLASS 2 SEVERE OBESITY DUE TO EXCESS CALORIES WITH SERIOUS COMORBIDITY AND BODY MASS INDEX (BMI) OF 35.0 TO 35.9 IN ADULT: Primary | ICD-10-CM

## 2025-07-17 DIAGNOSIS — E66.01 CLASS 2 SEVERE OBESITY DUE TO EXCESS CALORIES WITH SERIOUS COMORBIDITY AND BODY MASS INDEX (BMI) OF 35.0 TO 35.9 IN ADULT: Primary | ICD-10-CM

## 2025-07-17 DIAGNOSIS — G47.00 INSOMNIA, UNSPECIFIED TYPE: ICD-10-CM

## 2025-07-17 PROCEDURE — G0463 HOSPITAL OUTPT CLINIC VISIT: HCPCS

## 2025-07-17 NOTE — PROGRESS NOTES
Patient Care Team:  Mary Moreno APRN as PCP - General (Family Medicine)  jE Alfaro MD, MPH as Consulting Physician (Sleep Medicine)        History of Present Illness  The patient presents for sleep apnea, ADHD, weight management, and tobacco use.    He underwent a sleep study at [facility], which did not yield significant findings. A subsequent home sleep study was conducted, but the transducer malfunctioned for approximately 2.5 hours due to his habit of sleeping on his stomach. He was scheduled for an in-lab sleep study, but it was postponed due to his loss of insurance following job termination. His wife reports that he snores frequently. He typically goes to bed between 11 PM and midnight, waking up around 5:30 or 6 AM, resulting in an estimated sleep duration of 5 to 6 hours. Despite this, he often feels groggy upon waking. 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 He has gained 50 pounds over the past 5 years, transitioning from a physically demanding job as a  to a more sedentary office role. He also quit smoking for 2 years during this period, which increased his appetite. He has resumed tobacco use and is currently attempting to quit again. He reports frequent awakenings at night and feels worse after longer sleep durations. He suspects he grinds his teeth but has not seen a dentist recently. He has been informed that his tonsils are enlarged, although not swollen.    He has been taking Adderall for severe ADHD for the past 3 years.    He has just started Zepbound and has completed his third treatment.    SOCIAL HISTORY  The patient admits to using tobacco again and is working on trying to quit. 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    MEDICATIONS  Adderall, Zepbound       [place]: 5    Data Reviewed: Medical chart, sleep questionnaire and prior sleep study which did not demonstrate  "sleep apnea.  Study was performed at [place] and the TORI was 0.8 for this home sleep study and there was about a 2 and half hour period during which the transducer was not working this was interpreted by [facility] on 2024 and he recommended an in-lab polysomnogram.  This was not ever completed      PMH:  Past Medical History:   Diagnosis Date    ADHD (attention deficit hyperactivity disorder)     Never tested or treated    HL (hearing loss)     Multiple surgeries for ‘tubes’ in ears at a young age    Visual impairment     Carolyn had contacts/prescription glasses since I was 10-12 years old (roughly)          Allergies:  Patient has no known allergies.     Medication Review:   Current Outpatient Medications on File Prior to Visit   Medication Sig Dispense Refill    amphetamine-dextroamphetamine (ADDERALL) 20 MG tablet Take 1 tablet by mouth 3 times a day. 90 tablet 0    [] Tirzepatide-Weight Management (Zepbound) 2.5 MG/0.5ML solution Inject 0.5 mL under the skin into the appropriate area as directed 1 (One) Time Per Week for 4 doses. 2 mL 0     No current facility-administered medications on file prior to visit.         Vital Signs:    Vitals:    25 0816   BP: 118/77   Pulse: 107   SpO2: 95%   Weight: 113 kg (250 lb)   Height: 180.3 cm (70.98\")        Body mass index is 34.88 kg/m².  Neck Circumference: 18 inches  .BMIFOLLOWUP         Physical Exam:    Constitutional:  Well developed 29 y.o. male that appears in no apparent distress.  Awake & oriented times 3.  Normal mood with normal recent and remote memory and normal judgement.  Eyes:  Conjunctivae normal.  Oropharynx: Moist mucous membranes without exudate and Mallampati 3  Neck: Trachea midline  Respiratory: Effort is not labored  Cardiovascular: Radial pulse regular  Musculoskeletal: Gait appears normal, no digital clubbing evident, no pre-tibial edema        Impression:   Encounter Diagnoses   Name Primary?    Class 2 severe " obesity due to excess calories with serious comorbidity and body mass index (BMI) of 35.0 to 35.9 in adult Yes    Sleep disturbance      Patient's BMI is Body mass index is 34.88 kg/m².        Assessment & Plan  1. Sleep Apnea.  He reports snoring, frequent waking, and feeling groggy despite getting 5-6 hours of sleep. He also mentioned grinding his teeth and having enlarged tonsils, which are risk factors for sleep apnea. A home sleep study was previously inconclusive due to a malfunctioning transducer. An in-lab sleep study will be scheduled to ensure accurate results.    2. Attention Deficit Hyperactivity Disorder (ADHD).  He has been on Adderall for about 3 years to manage his ADHD symptoms.    3. Weight Management.  He has gained 50 pounds over the past 5 years due to lifestyle changes, including a sedentary job and quitting smoking. He recently started Zepbound and has completed his third treatment.    4. Tobacco Use.  He resumed using tobacco due to job-related stress after a 2-year cessation period. He is currently working on quitting again.     If he is sleep deprived from sleep apnea and that will make his ADHD worse.  I also think that his use of Adderall and nicotine does not improve his sleep at night likely contributing to his excessive daytime sleepiness which might be manifested by worsened ADHD.      The patient should practice good sleep hygiene measures.      Weight loss might be beneficial in this patient who has a Body mass index is 34.88 kg/m².      Pathophysiology of NEGRA described to the patient.  Cardiovascular complications of untreated NEGRA also reviewed.      The patient was cautioned about the dangers of drowsy driving.    Patient or patient representative verbalized consent for the use of Ambient Listening during the visit with  Luis Fernando Alfaro MD for chart documentation. 7/17/2025  08:42 EDT     Luis Fernando Alfaro MD  Sleep Medicine  07/17/25  08:29 EDT

## 2025-07-31 ENCOUNTER — TELEPHONE (OUTPATIENT)
Dept: INTERNAL MEDICINE | Age: 30
End: 2025-07-31

## 2025-07-31 ENCOUNTER — TELEMEDICINE (OUTPATIENT)
Dept: INTERNAL MEDICINE | Age: 30
End: 2025-07-31
Payer: COMMERCIAL

## 2025-07-31 VITALS — BODY MASS INDEX: 34.86 KG/M2 | WEIGHT: 249 LBS | HEIGHT: 71 IN

## 2025-07-31 DIAGNOSIS — E66.812 CLASS 2 SEVERE OBESITY DUE TO EXCESS CALORIES WITH SERIOUS COMORBIDITY AND BODY MASS INDEX (BMI) OF 35.0 TO 35.9 IN ADULT: Primary | ICD-10-CM

## 2025-07-31 DIAGNOSIS — E66.01 CLASS 2 SEVERE OBESITY DUE TO EXCESS CALORIES WITH SERIOUS COMORBIDITY AND BODY MASS INDEX (BMI) OF 35.0 TO 35.9 IN ADULT: Primary | ICD-10-CM

## 2025-07-31 PROCEDURE — 99213 OFFICE O/P EST LOW 20 MIN: CPT

## 2025-07-31 RX ORDER — TIRZEPATIDE 2.5 MG/.5ML
2.5 INJECTION, SOLUTION SUBCUTANEOUS WEEKLY
COMMUNITY
End: 2025-07-31

## 2025-07-31 RX ORDER — TIRZEPATIDE 5 MG/.5ML
5 INJECTION, SOLUTION SUBCUTANEOUS WEEKLY
Qty: 2 ML | Refills: 2 | Status: SHIPPED | OUTPATIENT
Start: 2025-07-31 | End: 2025-08-22

## 2025-07-31 NOTE — TELEPHONE ENCOUNTER
----- Message from Mary Moreno sent at 7/31/2025  9:40 AM EDT -----  Can you please call to schedule 3 month recheck weight - telehealth is OK

## 2025-08-01 ENCOUNTER — HOSPITAL ENCOUNTER (OUTPATIENT)
Dept: SLEEP MEDICINE | Facility: HOSPITAL | Age: 30
End: 2025-08-01
Payer: COMMERCIAL

## 2025-08-01 VITALS — WEIGHT: 249.12 LBS | HEIGHT: 71 IN | BODY MASS INDEX: 34.88 KG/M2

## 2025-08-01 DIAGNOSIS — F90.2 ATTENTION DEFICIT HYPERACTIVITY DISORDER (ADHD), COMBINED TYPE: ICD-10-CM

## 2025-08-01 DIAGNOSIS — E66.01 CLASS 2 SEVERE OBESITY DUE TO EXCESS CALORIES WITH SERIOUS COMORBIDITY AND BODY MASS INDEX (BMI) OF 35.0 TO 35.9 IN ADULT: ICD-10-CM

## 2025-08-01 DIAGNOSIS — G47.9 SLEEP DISTURBANCE: ICD-10-CM

## 2025-08-01 DIAGNOSIS — G47.00 INSOMNIA, UNSPECIFIED TYPE: ICD-10-CM

## 2025-08-01 DIAGNOSIS — E66.812 CLASS 2 SEVERE OBESITY DUE TO EXCESS CALORIES WITH SERIOUS COMORBIDITY AND BODY MASS INDEX (BMI) OF 35.0 TO 35.9 IN ADULT: ICD-10-CM

## 2025-08-01 PROCEDURE — 95810 POLYSOM 6/> YRS 4/> PARAM: CPT

## 2025-08-05 PROCEDURE — 95810 POLYSOM 6/> YRS 4/> PARAM: CPT | Performed by: PSYCHIATRY & NEUROLOGY

## 2025-08-06 ENCOUNTER — TELEPHONE (OUTPATIENT)
Dept: SLEEP MEDICINE | Facility: HOSPITAL | Age: 30
End: 2025-08-06
Payer: COMMERCIAL

## 2025-08-25 ENCOUNTER — OFFICE VISIT (OUTPATIENT)
Dept: SLEEP MEDICINE | Facility: HOSPITAL | Age: 30
End: 2025-08-25
Payer: COMMERCIAL

## 2025-08-25 VITALS — BODY MASS INDEX: 34.16 KG/M2 | HEART RATE: 99 BPM | WEIGHT: 244 LBS | HEIGHT: 71 IN | OXYGEN SATURATION: 96 %

## 2025-08-25 DIAGNOSIS — G47.21 CIRCADIAN RHYTHM SLEEP DISORDER, DELAYED SLEEP PHASE TYPE: Primary | ICD-10-CM

## 2025-08-25 DIAGNOSIS — E66.812 CLASS 2 SEVERE OBESITY DUE TO EXCESS CALORIES WITH SERIOUS COMORBIDITY AND BODY MASS INDEX (BMI) OF 35.0 TO 35.9 IN ADULT: ICD-10-CM

## 2025-08-25 DIAGNOSIS — E66.09 CLASS 1 OBESITY DUE TO EXCESS CALORIES WITH BODY MASS INDEX (BMI) OF 34.0 TO 34.9 IN ADULT, UNSPECIFIED WHETHER SERIOUS COMORBIDITY PRESENT: ICD-10-CM

## 2025-08-25 DIAGNOSIS — E66.811 CLASS 1 OBESITY DUE TO EXCESS CALORIES WITH BODY MASS INDEX (BMI) OF 34.0 TO 34.9 IN ADULT, UNSPECIFIED WHETHER SERIOUS COMORBIDITY PRESENT: ICD-10-CM

## 2025-08-25 DIAGNOSIS — G47.09 OTHER INSOMNIA: ICD-10-CM

## 2025-08-25 DIAGNOSIS — E66.01 CLASS 2 SEVERE OBESITY DUE TO EXCESS CALORIES WITH SERIOUS COMORBIDITY AND BODY MASS INDEX (BMI) OF 35.0 TO 35.9 IN ADULT: ICD-10-CM

## 2025-08-25 PROCEDURE — G0463 HOSPITAL OUTPT CLINIC VISIT: HCPCS

## 2025-08-25 PROCEDURE — 99214 OFFICE O/P EST MOD 30 MIN: CPT | Performed by: PSYCHIATRY & NEUROLOGY
